# Patient Record
Sex: FEMALE | Race: WHITE
[De-identification: names, ages, dates, MRNs, and addresses within clinical notes are randomized per-mention and may not be internally consistent; named-entity substitution may affect disease eponyms.]

---

## 2020-01-29 ENCOUNTER — HOSPITAL ENCOUNTER (OUTPATIENT)
Dept: HOSPITAL 53 - M SFHCWAGY | Age: 70
End: 2020-01-29
Attending: SURGERY
Payer: MEDICARE

## 2020-01-29 DIAGNOSIS — Q83.9: Primary | ICD-10-CM

## 2020-02-11 ENCOUNTER — HOSPITAL ENCOUNTER (OUTPATIENT)
Dept: HOSPITAL 53 - M WHC | Age: 70
End: 2020-02-11
Attending: SURGERY
Payer: MEDICARE

## 2020-02-11 DIAGNOSIS — N63.21: Primary | ICD-10-CM

## 2020-02-11 NOTE — REP
Focused left breast sonography:

 

History:  History of bilateral breast reduction.  Left breast mass.  Palpable

area inferior scar at 6 o'clock near the inframammary fold on the left.

 

Comparison mammography is from September 12, 2019.

 

Sonographic findings:  Left breast focal scanning in the area of the 6 o'clock

position is performed.  Normal stromal elements are seen.  No mass or cyst is

identified sonographically.  No evidence of acoustic shadowing is seen.

 

Impression:

 

BIRADS category 1 negative findings left breast focused ultrasound 6 o'clock

position.  Clinical followup is advised.